# Patient Record
Sex: FEMALE | Race: WHITE | Employment: OTHER | URBAN - METROPOLITAN AREA
[De-identification: names, ages, dates, MRNs, and addresses within clinical notes are randomized per-mention and may not be internally consistent; named-entity substitution may affect disease eponyms.]

---

## 2019-05-09 ENCOUNTER — HOSPITAL ENCOUNTER (EMERGENCY)
Age: 79
Discharge: HOME | End: 2019-05-09
Payer: MEDICARE

## 2019-05-09 ENCOUNTER — APPOINTMENT (OUTPATIENT)
Dept: GENERAL RADIOLOGY | Age: 79
End: 2019-05-09
Payer: MEDICARE

## 2019-05-09 VITALS
SYSTOLIC BLOOD PRESSURE: 138 MMHG | RESPIRATION RATE: 15 BRPM | OXYGEN SATURATION: 97 % | WEIGHT: 160.72 LBS | HEIGHT: 62 IN | TEMPERATURE: 98.9 F | BODY MASS INDEX: 29.58 KG/M2 | HEART RATE: 86 BPM | DIASTOLIC BLOOD PRESSURE: 92 MMHG

## 2019-05-09 DIAGNOSIS — S92.351A CLOSED FRACTURE OF BASE OF FIFTH METATARSAL BONE OF RIGHT FOOT, INITIAL ENCOUNTER: Primary | ICD-10-CM

## 2019-05-09 PROCEDURE — 73630 X-RAY EXAM OF FOOT: CPT

## 2019-05-09 PROCEDURE — 99283 EMERGENCY DEPT VISIT LOW MDM: CPT

## 2019-05-09 PROCEDURE — 73610 X-RAY EXAM OF ANKLE: CPT

## 2019-05-09 RX ORDER — MULTIVIT-MIN/IRON/FOLIC ACID/K 18-600-40
3000 CAPSULE ORAL DAILY
COMMUNITY

## 2019-05-09 RX ORDER — OMEPRAZOLE 20 MG/1
20 CAPSULE, DELAYED RELEASE ORAL DAILY
COMMUNITY

## 2019-05-09 RX ORDER — LOVASTATIN 20 MG/1
20 TABLET ORAL NIGHTLY
COMMUNITY

## 2019-05-09 RX ORDER — LORAZEPAM 0.5 MG/1
0.5 TABLET ORAL EVERY 6 HOURS PRN
COMMUNITY

## 2019-05-09 RX ORDER — BIOTIN 10 MG
10 TABLET ORAL DAILY
COMMUNITY

## 2019-05-09 SDOH — HEALTH STABILITY: MENTAL HEALTH: HOW OFTEN DO YOU HAVE A DRINK CONTAINING ALCOHOL?: NEVER

## 2019-05-09 ASSESSMENT — PAIN DESCRIPTION - PROGRESSION: CLINICAL_PROGRESSION: NOT CHANGED

## 2019-05-09 ASSESSMENT — PAIN DESCRIPTION - ORIENTATION
ORIENTATION: RIGHT
ORIENTATION: RIGHT

## 2019-05-09 ASSESSMENT — PAIN DESCRIPTION - ONSET: ONSET: ON-GOING

## 2019-05-09 ASSESSMENT — PAIN SCALES - GENERAL
PAINLEVEL_OUTOF10: 3

## 2019-05-09 ASSESSMENT — PAIN DESCRIPTION - LOCATION
LOCATION: ANKLE
LOCATION: ANKLE

## 2019-05-09 ASSESSMENT — PAIN DESCRIPTION - DESCRIPTORS: DESCRIPTORS: ACHING

## 2019-05-09 ASSESSMENT — PAIN DESCRIPTION - PAIN TYPE
TYPE: ACUTE PAIN
TYPE: ACUTE PAIN

## 2019-05-09 ASSESSMENT — PAIN DESCRIPTION - FREQUENCY: FREQUENCY: CONTINUOUS

## 2019-05-09 NOTE — ED NOTES
Patient place in right walking boot. Instructed to remain non weight bearing until cleared by ortho. Patient verbalized understanding of crutch use and returned demonstration. --Patient provided with discharge instructions and any prescriptions. --Instructions, dosing, and follow-up appointments reviewed with patient/family. No further questions or needs at this time. --Vital signs and patient stable upon discharge. Offered wheelchair from department- assisted into car.       Andrew Conley RN  05/09/19 7123

## 2019-05-10 NOTE — ED PROVIDER NOTES
[address] ENCOUNTER      Pt Name: Dae Mcdowell  MRN: [id number]  [place] 1940  Date of evaluation: 5/9/2019  Provider: Mitch Mederos [address] [place] [address]  Chief Complaint   Patient presents with    Ankle Pain     Twisted Right ankle on steps last night. Swollen and painful to walk. HPI  Dae Mcdowell is a [de-identified] y.o. female who presents with right foot pain that started last night. She states she caught her foot on the step wrong and twisted her right foot. She states it occurred about 10:25 PM. She's been taking Aleve and placing ice on it. Ever, has continued to hurt. She came in respiratory mucosa swelling bruising. She had a previous fractures this foot or ankle. Movement makes it worse weightbearing makes it worse and rest makes it better. ? REVIEW OF SYSTEMS    All systems negative except as noted in the HPI. Reviewed Nurses' notes and concur. No LMP recorded. Patient is postmenopausal.    PAST MEDICAL HISTORY  Past Medical History:   Diagnosis Date    Anxiety     Hypertension     Sarcoidosis of lung ([facility] [address]        FAMILY HISTORY  History reviewed. No pertinent family history. SOCIAL HISTORY   reports that she has never smoked. She has never used smokeless tobacco. She reports that she drank alcohol. She reports that she does not use drugs. SURGICAL HISTORY  History reviewed. No pertinent surgical history. CURRENT MEDICATIONS  Current Outpatient Rx   Medication Sig Dispense Refill    omeprazole (PRILOSEC) 20 MG delayed release capsule Take 20 mg by mouth daily      lovastatin (MEVACOR) 20 MG tablet Take 20 mg by mouth nightly      Cholecalciferol (VITAMIN D) 2000 units CAPS capsule Take 3,000 Units by mouth daily      Biotin 10 MG tablet Take 10 mg by mouth daily      LORazepam (ATIVAN) 0.5 MG tablet Take 0.5 mg by mouth every 6 hours as needed for Anxiety.          ALLERGIES  Allergies   Allergen Reactions    Augmentin [Amoxicillin-Pot Clavulanate] Nausea And Vomiting       [unfilled]      PHYSICAL EXAM  VITAL SIGNS: BP (!) 138/92   Pulse 86   Temp 98.9 °F (37.2 °C) (Oral)   Resp 15   Ht 5' 2\" (1.575 m)   Wt 160 lb 11.5 oz (72.9 kg)   SpO2 97%   BMI 29.40 kg/m²   Constitutional: Well-developed, well-nourished, appears normal, nontoxic, activity: Syncope on the cart, talking with her visitor. Skin: Warm, Dry, No erythema, No rash. no Lacerations, no Abrasions. Back: No tenderness, Full range of motion, No scoliosis. Extremities:  neurovascular intact, no deformity, moderate lateral right foot swelling, no erythema, no lacerations noted, no amputations, no cyanosis, no mottling, moderate lateral right foot ecchymosis, moderate tenderness of proximal 5th metatarsal, capillary refill less than 2 seconds. Musculoskeletal: Good range of motion in all other major joints except those described above. Neurologic: Alert & oriented x 3, Normal motor function, Normal sensory function, No focal deficits noted. Psychiatric: Anxious, Judgment normal, Mood normal, no confusion. LABORATORY  Labs Reviewed - No data to display      RADIOLOGY/PROCEDURES  I personally reviewed the images for this case. XR FOOT RIGHT (MIN 3 VIEWS)   Final Result   1. Comminuted, nondisplaced fracture of the base of the 5th metatarsal bone. 2. No acute osseous abnormality of the right ankle. 3. Moderate bunion deformity. XR ANKLE RIGHT (MIN 3 VIEWS)   Final Result   1. Comminuted, nondisplaced fracture of the base of the 5th metatarsal bone. 2. No acute osseous abnormality of the right ankle. 3. Moderate bunion deformity. NARCOTIC REVIEW      COURSE & MEDICAL DECISION MAKING  Pertinent Imaging studies reviewed.  (See chart for details)    Vitals:    05/09/19 1250 05/09/19 1352   BP: (!) 149/87 (!) 138/92   Pulse: 94 86   Resp: 15 15   Temp: 98.9 °F (37.2 °C)    TempSrc: Oral    SpO2: 97%    Weight: 160 lb 11.5 oz (72.9 kg)    Height: 5' 2\" (1.575 m)        [unfilled]    Medications - No data to display    Discharge Medication List as of 5/9/2019  1:41 PM          Patient remained stable in the ED. x-rays revealed a fracture of the proximal 5th metatarsal of the right foot. There is no deformity or displacement. Patient was placed in a postop shoe and given crutches. She was instructed in nonweightbearing. She was instructed to follow up with orthopedic doctors in 3-5 days and return if any problems    The patient's blood pressure was found to be elevated according to CMS/Medicare and the Affordable Care Act/ObamaCare criteria. Elevated blood pressure could occur because of pain or anxiety or other reasons and does not mean that they need to have their blood pressure treated or medications otherwise adjusted. However, this could also be a sign that they will need to have their blood pressure treated or medications changed. The patient was instructed to follow up closely with their personal physician to have their blood pressure rechecked. The patient was instructed to take a list of recent blood pressure readings to their next visit with their personal physician. See discharge instructions for specific medications, discharge information, and treatments. They were verbally instructed to return to emergency if any problems. (This chart has been completed using [address]. Although attempts have been made to ensure accuracy, words and/or phrases may not be transcribed as intended.)    Patient refused pain medicines at the time of his exam.    Tetanus vaccination status reviewed: tetanus re-vaccination not indicated. IMPRESSION(S):  1. Closed fracture of base of fifth metatarsal bone of right foot, initial encounter        ?   Recheck Times: [address]   [address]  05/10/19 2138